# Patient Record
Sex: FEMALE | Race: ASIAN | HISPANIC OR LATINO | Employment: STUDENT | URBAN - METROPOLITAN AREA
[De-identification: names, ages, dates, MRNs, and addresses within clinical notes are randomized per-mention and may not be internally consistent; named-entity substitution may affect disease eponyms.]

---
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
## 2019-08-09 ENCOUNTER — OFFICE VISIT (OUTPATIENT)
Dept: PEDIATRICS CLINIC | Age: 19
End: 2019-08-09
Payer: COMMERCIAL

## 2019-08-09 VITALS
TEMPERATURE: 98.6 F | RESPIRATION RATE: 16 BRPM | BODY MASS INDEX: 25.33 KG/M2 | HEART RATE: 92 BPM | WEIGHT: 143 LBS | SYSTOLIC BLOOD PRESSURE: 108 MMHG | DIASTOLIC BLOOD PRESSURE: 70 MMHG

## 2019-08-09 DIAGNOSIS — K13.0 GRANULOMATOUS CHEILITIS: ICD-10-CM

## 2019-08-09 DIAGNOSIS — J02.9 SORE THROAT: Primary | ICD-10-CM

## 2019-08-09 DIAGNOSIS — R05.9 COUGH: ICD-10-CM

## 2019-08-09 DIAGNOSIS — R11.0 NAUSEA: ICD-10-CM

## 2019-08-09 LAB — S PYO AG THROAT QL: NEGATIVE

## 2019-08-09 PROCEDURE — 87880 STREP A ASSAY W/OPTIC: CPT | Performed by: PEDIATRICS

## 2019-08-09 PROCEDURE — 99213 OFFICE O/P EST LOW 20 MIN: CPT | Performed by: PEDIATRICS

## 2019-08-11 LAB — B-HEM STREP SPEC QL CULT: NEGATIVE

## 2019-08-14 ENCOUNTER — OFFICE VISIT (OUTPATIENT)
Dept: PEDIATRICS CLINIC | Age: 19
End: 2019-08-14
Payer: COMMERCIAL

## 2019-08-14 VITALS
BODY MASS INDEX: 25.33 KG/M2 | TEMPERATURE: 97.2 F | DIASTOLIC BLOOD PRESSURE: 70 MMHG | SYSTOLIC BLOOD PRESSURE: 112 MMHG | WEIGHT: 143 LBS

## 2019-08-14 DIAGNOSIS — J45.20 MILD INTERMITTENT REACTIVE AIRWAY DISEASE: Primary | ICD-10-CM

## 2019-08-14 DIAGNOSIS — J20.9 ACUTE BRONCHITIS, UNSPECIFIED ORGANISM: ICD-10-CM

## 2019-08-14 PROCEDURE — 94664 DEMO&/EVAL PT USE INHALER: CPT | Performed by: PEDIATRICS

## 2019-08-14 PROCEDURE — 99213 OFFICE O/P EST LOW 20 MIN: CPT | Performed by: PEDIATRICS

## 2019-08-14 RX ORDER — AZITHROMYCIN 250 MG/1
TABLET, FILM COATED ORAL
Qty: 6 TABLET | Refills: 0 | Status: SHIPPED | OUTPATIENT
Start: 2019-08-14 | End: 2019-08-18

## 2019-08-14 NOTE — PROGRESS NOTES
Assessment/Plan: We will try proair   Instructions given on how to use it  Will also start on zithromax      Subjective: cough     Patient ID: Deisy Perkins is a 20 y o  female  HPI- has been coughing for 1 week and she is taking benadryl and nyquil and mucinex and it is getting worse  No fever  Nose is dry  SH will be leaving for [facility] - taking Humera every 2 weeks  for granulomatous cheilitis  Review of Systems   Constitutional: Negative for activity change and appetite change  Not as tired   HENT: Negative for congestion and sore throat  Respiratory: Positive for cough  Negative for wheezing  Objective:      /70   Temp (!) 97 2 °F (36 2 °C)   Wt 64 9 kg (143 lb)   BMI 25 33 kg/m²          Physical Exam   Constitutional: No distress  HENT:   TM's not red and throat not red   Cardiovascular:   No murmur heard  Pulmonary/Chest: Breath sounds normal    Skin: No rash noted

## 2020-01-02 PROBLEM — M67.431 GANGLION CYST OF WRIST, RIGHT: Status: ACTIVE | Noted: 2020-01-02

## 2020-03-09 ENCOUNTER — OFFICE VISIT (OUTPATIENT)
Dept: FAMILY MEDICINE CLINIC | Facility: CLINIC | Age: 20
End: 2020-03-09
Payer: COMMERCIAL

## 2020-03-09 VITALS
SYSTOLIC BLOOD PRESSURE: 100 MMHG | RESPIRATION RATE: 16 BRPM | BODY MASS INDEX: 26.58 KG/M2 | TEMPERATURE: 97 F | DIASTOLIC BLOOD PRESSURE: 78 MMHG | WEIGHT: 150 LBS | HEIGHT: 63 IN | HEART RATE: 72 BPM

## 2020-03-09 DIAGNOSIS — Z11.4 SCREENING FOR HIV (HUMAN IMMUNODEFICIENCY VIRUS): ICD-10-CM

## 2020-03-09 DIAGNOSIS — R53.83 FATIGUE, UNSPECIFIED TYPE: ICD-10-CM

## 2020-03-09 DIAGNOSIS — H66.001 NON-RECURRENT ACUTE SUPPURATIVE OTITIS MEDIA OF RIGHT EAR WITHOUT SPONTANEOUS RUPTURE OF TYMPANIC MEMBRANE: ICD-10-CM

## 2020-03-09 DIAGNOSIS — F41.9 ANXIETY: Primary | ICD-10-CM

## 2020-03-09 PROCEDURE — 99204 OFFICE O/P NEW MOD 45 MIN: CPT | Performed by: FAMILY MEDICINE

## 2020-03-09 PROCEDURE — 3008F BODY MASS INDEX DOCD: CPT | Performed by: FAMILY MEDICINE

## 2020-03-09 PROCEDURE — 1036F TOBACCO NON-USER: CPT | Performed by: FAMILY MEDICINE

## 2020-03-09 RX ORDER — HYDROXYZINE HYDROCHLORIDE 25 MG/1
25 TABLET, FILM COATED ORAL EVERY 6 HOURS PRN
Qty: 30 TABLET | Refills: 0 | Status: SHIPPED | OUTPATIENT
Start: 2020-03-09 | End: 2020-11-23 | Stop reason: ALTCHOICE

## 2020-03-09 RX ORDER — AMOXICILLIN 875 MG/1
875 TABLET, COATED ORAL 2 TIMES DAILY
Qty: 20 TABLET | Refills: 0 | Status: SHIPPED | OUTPATIENT
Start: 2020-03-09 | End: 2020-03-19

## 2020-03-09 NOTE — PATIENT INSTRUCTIONS
Iron Deficiency Anemia   WHAT YOU NEED TO KNOW:   Iron deficiency anemia (HAJA) is low levels of red blood cells and hemoglobin caused by a lack of iron in the blood  Iron helps make hemoglobin  Hemoglobin is part of your red blood cell and helps carry oxygen to your body  The most common causes are blood loss and not enough iron in the foods you eat  DISCHARGE INSTRUCTIONS:   Call 911 for any of the following:   · You have shortness of breath, even when you rest     Return to the emergency department if:   · You have dark or bloody bowel movements  · You are too dizzy to stand up  · You have trouble swallowing because of the pain in your mouth and throat  Contact your healthcare provider if:   · You have heartburn, constipation, or diarrhea  · You have nausea or are vomiting  · You are dizzy or very tired  · You have questions or concerns about your condition or care  Medicines: You may need any of the following:  · Iron or folic acid supplements  will help increase your red blood cell and hemoglobin levels  · Bowel movement softeners  may be needed if the iron supplements cause constipation  · Take your medicine as directed  Contact your healthcare provider if you think your medicine is not helping or if you have side effects  Tell him of her if you are allergic to any medicine  Keep a list of the medicines, vitamins, and herbs you take  Include the amounts, and when and why you take them  Bring the list or the pill bottles to follow-up visits  Carry your medicine list with you in case of an emergency  Eat foods rich in iron and protein:  Nuts, meat, dark leafy green vegetables, and beans are high in iron and protein  Do not drink coffee, tea, or other liquids with caffeine  Limit milk to 2 cups a day  You may need to meet with a dietitian to create the right food plan for you  Drink liquids as directed:  Liquids help prevent constipation   Ask how much liquid to drink each day and which liquids are best for you  Follow up with your healthcare provider as directed:  Write down your questions so you remember to ask them during your visits  © 2017 [address] [employer] Information is for End User's use only and may not be sold, redistributed or otherwise used for commercial purposes  All illustrations and images included in CareNotes® are the copyrighted property of A D A M , Inc  or [employer]  The above information is an  only  It is not intended as medical advice for individual conditions or treatments  Talk to your doctor, nurse or pharmacist before following any medical regimen to see if it is safe and effective for you

## 2020-03-09 NOTE — PROGRESS NOTES
Assessment/Plan:       Diagnoses and all orders for this visit:    Anxiety  -     hydrOXYzine HCL (ATARAX) 25 mg tablet; Take 1 tablet (25 mg total) by mouth every 6 (six) hours as needed for anxiety  - Discussed therapy and possible long term therapy with patient, but she states she is going back to college this week and won't be back til summer  Recommend if symptoms worsen, she find a therapist and physician on campus  Fatigue, unspecified type  -     CBC; Future  -     Iron Panel (Includes Ferritin, Iron Sat%, Iron, and TIBC); Future  -     TSH, 3rd generation with Free T4 reflex; Future  -     Comprehensive metabolic panel; Future  -     CBC  -     TSH, 3rd generation with Free T4 reflex  -     Comprehensive metabolic panel    Non-recurrent acute suppurative otitis media of right ear without spontaneous rupture of tympanic membrane  -     amoxicillin (AMOXIL) 875 mg tablet; Take 1 tablet (875 mg total) by mouth 2 (two) times a day for 10 days  - Supportive care reviewed  Screening for HIV (human immunodeficiency virus)  -     HIV 1/2 Antigen/Antibody (4th Generation) w Reflex [facility]; Future      BMI Counseling: Body mass index is 26 57 kg/m²  Discussed with patient's BMI with her  The BMI is above normal  Nutrition recommendations include reducing portion sizes, decreasing overall calorie intake, 3-5 servings of fruits/vegetables daily and reducing fast food intake  Exercise recommendations include moderate aerobic physical activity for 150 minutes/week  Subjective:      Patient ID: Magdalene Desai is a 23 y o  female  URI    This is a new problem  The current episode started in the past 7 days  The problem has been gradually worsening  There has been no fever  Associated symptoms include congestion, coughing, ear pain and a sore throat   Pertinent negatives include no abdominal pain, headaches, joint pain, joint swelling, nausea, neck pain, plugged ear sensation, rash, rhinorrhea, sinus pain, sneezing, swollen glands, vomiting or wheezing  Treatments tried: Nyquil  The treatment provided no relief  Anemia   Presents for initial visit  The condition has lasted for 3 months  Symptoms include malaise/fatigue and pallor  There has been no abdominal pain, anorexia, bruising/bleeding easily, confusion, fever, leg swelling, light-headedness, palpitations, paresthesias, pica or weight loss  Signs of blood loss that are present include menorrhagia  Signs of blood loss that are not present include hematemesis, hematochezia, melena and vaginal bleeding  Past treatments include nothing  Anxiety   Presents for initial visit  Onset was 6 to 12 months ago  The problem has been unchanged  Symptoms include excessive worry and nervous/anxious behavior  Patient reports no confusion, nausea or palpitations  Symptoms occur occasionally  The severity of symptoms is moderate  The quality of sleep is fair  Her past medical history is significant for anemia  The following portions of the patient's history were reviewed and updated as appropriate: allergies, current medications, past family history, past medical history, past social history, past surgical history and problem list     Review of Systems   Constitutional: Positive for malaise/fatigue  Negative for fever and weight loss  HENT: Positive for congestion, ear pain and sore throat  Negative for rhinorrhea, sinus pain and sneezing  Respiratory: Positive for cough  Negative for wheezing  Cardiovascular: Negative for palpitations  Gastrointestinal: Negative for abdominal pain, anorexia, hematemesis, hematochezia, melena, nausea and vomiting  Genitourinary: Positive for menorrhagia  Negative for vaginal bleeding  Musculoskeletal: Negative for joint pain and neck pain  Skin: Positive for pallor  Negative for rash  Neurological: Negative for light-headedness, headaches and paresthesias  Hematological: Does not bruise/bleed easily  Psychiatric/Behavioral: Negative for confusion  The patient is nervous/anxious  Objective:      /78   Pulse 72   Temp (!) 97 °F (36 1 °C)   Resp 16   Ht 5' 3" (1 6 m)   Wt 68 kg (150 lb)   LMP 02/27/2020 (Exact Date)   BMI 26 57 kg/m²          Physical Exam   Constitutional: She is oriented to person, place, and time  She appears well-developed and well-nourished  No distress  HENT:   Head: Normocephalic and atraumatic  Right Ear: Hearing and external ear normal  Tympanic membrane is erythematous and bulging  Left Ear: Hearing, external ear and ear canal normal    Nose: Nose normal    Mouth/Throat: Posterior oropharyngeal erythema present  No oropharyngeal exudate  Tonsils are 1+ on the right  Tonsils are 1+ on the left  No tonsillar exudate  Neck: Normal range of motion  Neck supple  Cardiovascular: Normal rate, regular rhythm, normal heart sounds and intact distal pulses  Exam reveals no gallop and no friction rub  No murmur heard  Pulmonary/Chest: Effort normal and breath sounds normal  No respiratory distress  She has no wheezes  She has no rales  She exhibits no tenderness  Abdominal: Soft  Bowel sounds are normal  She exhibits no distension and no mass  There is no tenderness  There is no rebound and no guarding  Musculoskeletal: Normal range of motion  She exhibits no edema or deformity  Lymphadenopathy:     She has cervical adenopathy  Neurological: She is alert and oriented to person, place, and time  Skin: Skin is warm and dry  She is not diaphoretic  Psychiatric: She has a normal mood and affect   Her behavior is normal  Judgment and thought content normal

## 2020-03-12 LAB
ALBUMIN SERPL-MCNC: 4.3 G/DL (ref 3.9–5)
ALBUMIN/GLOB SERPL: 1.4 {RATIO} (ref 1.2–2.2)
ALP SERPL-CCNC: 70 IU/L (ref 39–117)
ALT SERPL-CCNC: 17 IU/L (ref 0–32)
AST SERPL-CCNC: 17 IU/L (ref 0–40)
BILIRUB SERPL-MCNC: 0.5 MG/DL (ref 0–1.2)
BUN SERPL-MCNC: 5 MG/DL (ref 6–20)
BUN/CREAT SERPL: 8 (ref 9–23)
CALCIUM SERPL-MCNC: 9.3 MG/DL (ref 8.7–10.2)
CHLORIDE SERPL-SCNC: 102 MMOL/L (ref 96–106)
CO2 SERPL-SCNC: 22 MMOL/L (ref 20–29)
CREAT SERPL-MCNC: 0.62 MG/DL (ref 0.57–1)
ERYTHROCYTE [DISTWIDTH] IN BLOOD BY AUTOMATED COUNT: 14.9 % (ref 11.7–15.4)
FERRITIN SERPL-MCNC: 3 NG/ML (ref 15–150)
GLOBULIN SER-MCNC: 3.1 G/DL (ref 1.5–4.5)
GLUCOSE SERPL-MCNC: 92 MG/DL (ref 65–99)
HCT VFR BLD AUTO: 32.9 % (ref 34–46.6)
HGB BLD-MCNC: 10.3 G/DL (ref 11.1–15.9)
HIV 1+2 AB+HIV1 P24 AG SERPL QL IA: NON REACTIVE
IRON SATN MFR SERPL: 6 % (ref 15–55)
IRON SERPL-MCNC: 29 UG/DL (ref 27–159)
MCH RBC QN AUTO: 21.8 PG (ref 26.6–33)
MCHC RBC AUTO-ENTMCNC: 31.3 G/DL (ref 31.5–35.7)
MCV RBC AUTO: 70 FL (ref 79–97)
PLATELET # BLD AUTO: 363 X10E3/UL (ref 150–450)
POTASSIUM SERPL-SCNC: 4.3 MMOL/L (ref 3.5–5.2)
PROT SERPL-MCNC: 7.4 G/DL (ref 6–8.5)
RBC # BLD AUTO: 4.72 X10E6/UL (ref 3.77–5.28)
SL AMB EGFR AFRICAN AMERICAN: 149 ML/MIN/1.73
SL AMB EGFR NON AFRICAN AMERICAN: 129 ML/MIN/1.73
SODIUM SERPL-SCNC: 138 MMOL/L (ref 134–144)
TIBC SERPL-MCNC: 450 UG/DL (ref 250–450)
TSH SERPL DL<=0.005 MIU/L-ACNC: 1.09 UIU/ML (ref 0.45–4.5)
UIBC SERPL-MCNC: 421 UG/DL (ref 131–425)
WBC # BLD AUTO: 7.1 X10E3/UL (ref 3.4–10.8)

## 2020-03-13 ENCOUNTER — TELEPHONE (OUTPATIENT)
Dept: FAMILY MEDICINE CLINIC | Facility: CLINIC | Age: 20
End: 2020-03-13

## 2020-03-13 DIAGNOSIS — D50.9 IRON DEFICIENCY ANEMIA, UNSPECIFIED IRON DEFICIENCY ANEMIA TYPE: Primary | ICD-10-CM

## 2020-03-13 RX ORDER — FERROUS SULFATE TAB EC 324 MG (65 MG FE EQUIVALENT) 324 (65 FE) MG
324 TABLET DELAYED RESPONSE ORAL
Qty: 60 TABLET | Refills: 1 | Status: SHIPPED | OUTPATIENT
Start: 2020-03-13 | End: 2021-09-28 | Stop reason: SDUPTHER

## 2020-05-19 ENCOUNTER — OFFICE VISIT (OUTPATIENT)
Dept: FAMILY MEDICINE CLINIC | Facility: CLINIC | Age: 20
End: 2020-05-19
Payer: COMMERCIAL

## 2020-05-19 ENCOUNTER — TELEPHONE (OUTPATIENT)
Dept: FAMILY MEDICINE CLINIC | Facility: CLINIC | Age: 20
End: 2020-05-19

## 2020-05-19 VITALS
TEMPERATURE: 98.4 F | BODY MASS INDEX: 28 KG/M2 | DIASTOLIC BLOOD PRESSURE: 60 MMHG | HEIGHT: 63 IN | HEART RATE: 72 BPM | SYSTOLIC BLOOD PRESSURE: 100 MMHG | RESPIRATION RATE: 16 BRPM | WEIGHT: 158 LBS

## 2020-05-19 DIAGNOSIS — T14.8XXA SPLINTER IN SKIN: Primary | ICD-10-CM

## 2020-05-19 PROCEDURE — 1036F TOBACCO NON-USER: CPT | Performed by: FAMILY MEDICINE

## 2020-05-19 PROCEDURE — 3008F BODY MASS INDEX DOCD: CPT | Performed by: FAMILY MEDICINE

## 2020-05-19 PROCEDURE — 99213 OFFICE O/P EST LOW 20 MIN: CPT | Performed by: FAMILY MEDICINE

## 2020-05-22 ENCOUNTER — TELEPHONE (OUTPATIENT)
Dept: FAMILY MEDICINE CLINIC | Facility: CLINIC | Age: 20
End: 2020-05-22

## 2020-05-22 DIAGNOSIS — T14.8XXA SPLINTER IN SKIN: Primary | ICD-10-CM

## 2020-05-22 RX ORDER — SULFAMETHOXAZOLE AND TRIMETHOPRIM 800; 160 MG/1; MG/1
1 TABLET ORAL EVERY 12 HOURS SCHEDULED
Qty: 14 TABLET | Refills: 0 | Status: SHIPPED | OUTPATIENT
Start: 2020-05-22 | End: 2020-05-29

## 2020-11-23 ENCOUNTER — OFFICE VISIT (OUTPATIENT)
Dept: FAMILY MEDICINE CLINIC | Facility: CLINIC | Age: 20
End: 2020-11-23
Payer: COMMERCIAL

## 2020-11-23 VITALS
DIASTOLIC BLOOD PRESSURE: 70 MMHG | SYSTOLIC BLOOD PRESSURE: 110 MMHG | HEART RATE: 83 BPM | WEIGHT: 159 LBS | OXYGEN SATURATION: 99 % | BODY MASS INDEX: 28.17 KG/M2 | RESPIRATION RATE: 16 BRPM | TEMPERATURE: 97.5 F | HEIGHT: 63 IN

## 2020-11-23 DIAGNOSIS — Z13.83 SCREENING FOR CARDIOVASCULAR, RESPIRATORY, AND GENITOURINARY DISEASES: ICD-10-CM

## 2020-11-23 DIAGNOSIS — Z13.89 SCREENING FOR CARDIOVASCULAR, RESPIRATORY, AND GENITOURINARY DISEASES: ICD-10-CM

## 2020-11-23 DIAGNOSIS — Z23 IMMUNIZATION DUE: ICD-10-CM

## 2020-11-23 DIAGNOSIS — D50.8 OTHER IRON DEFICIENCY ANEMIA: ICD-10-CM

## 2020-11-23 DIAGNOSIS — Z13.1 SCREENING FOR DIABETES MELLITUS (DM): ICD-10-CM

## 2020-11-23 DIAGNOSIS — Z00.00 HEALTHCARE MAINTENANCE: Primary | ICD-10-CM

## 2020-11-23 DIAGNOSIS — Z13.6 SCREENING FOR CARDIOVASCULAR, RESPIRATORY, AND GENITOURINARY DISEASES: ICD-10-CM

## 2020-11-23 DIAGNOSIS — K50.90 CROHN'S DISEASE WITHOUT COMPLICATION, UNSPECIFIED GASTROINTESTINAL TRACT LOCATION (HCC): ICD-10-CM

## 2020-11-23 PROBLEM — D50.9 IDA (IRON DEFICIENCY ANEMIA): Status: ACTIVE | Noted: 2020-11-23

## 2020-11-23 PROCEDURE — 90686 IIV4 VACC NO PRSV 0.5 ML IM: CPT

## 2020-11-23 PROCEDURE — 90715 TDAP VACCINE 7 YRS/> IM: CPT

## 2020-11-23 PROCEDURE — 99214 OFFICE O/P EST MOD 30 MIN: CPT | Performed by: FAMILY MEDICINE

## 2020-11-23 PROCEDURE — 90472 IMMUNIZATION ADMIN EACH ADD: CPT

## 2020-11-23 PROCEDURE — 3008F BODY MASS INDEX DOCD: CPT | Performed by: FAMILY MEDICINE

## 2020-11-23 PROCEDURE — 90471 IMMUNIZATION ADMIN: CPT

## 2020-11-23 PROCEDURE — 3725F SCREEN DEPRESSION PERFORMED: CPT | Performed by: FAMILY MEDICINE

## 2020-11-23 PROCEDURE — 1036F TOBACCO NON-USER: CPT | Performed by: FAMILY MEDICINE

## 2020-11-23 PROCEDURE — 90651 9VHPV VACCINE 2/3 DOSE IM: CPT

## 2020-12-16 LAB
IRON SATN MFR SERPL: 10 % (ref 15–55)
IRON SERPL-MCNC: 45 UG/DL (ref 27–159)
TIBC SERPL-MCNC: 450 UG/DL (ref 250–450)
UIBC SERPL-MCNC: 405 UG/DL (ref 131–425)

## 2020-12-17 LAB
ALBUMIN SERPL-MCNC: 4.5 G/DL (ref 3.9–5)
ALBUMIN/GLOB SERPL: 1.4 {RATIO} (ref 1.2–2.2)
ALP SERPL-CCNC: 67 IU/L (ref 39–117)
ALT SERPL-CCNC: 18 IU/L (ref 0–32)
AST SERPL-CCNC: 16 IU/L (ref 0–40)
BASOPHILS # BLD AUTO: 0.1 X10E3/UL (ref 0–0.2)
BASOPHILS NFR BLD AUTO: 1 %
BILIRUB SERPL-MCNC: 0.4 MG/DL (ref 0–1.2)
BUN SERPL-MCNC: 7 MG/DL (ref 6–20)
BUN/CREAT SERPL: 10 (ref 9–23)
CALCIUM SERPL-MCNC: 9.5 MG/DL (ref 8.7–10.2)
CHLORIDE SERPL-SCNC: 103 MMOL/L (ref 96–106)
CHOLEST SERPL-MCNC: 163 MG/DL (ref 100–199)
CO2 SERPL-SCNC: 23 MMOL/L (ref 20–29)
CREAT SERPL-MCNC: 0.67 MG/DL (ref 0.57–1)
EOSINOPHIL # BLD AUTO: 0.3 X10E3/UL (ref 0–0.4)
EOSINOPHIL NFR BLD AUTO: 5 %
ERYTHROCYTE [DISTWIDTH] IN BLOOD BY AUTOMATED COUNT: 14.1 % (ref 11.7–15.4)
GLOBULIN SER-MCNC: 3.3 G/DL (ref 1.5–4.5)
GLUCOSE SERPL-MCNC: 95 MG/DL (ref 65–99)
HCT VFR BLD AUTO: 37.8 % (ref 34–46.6)
HDLC SERPL-MCNC: 41 MG/DL
HGB BLD-MCNC: 11.3 G/DL (ref 11.1–15.9)
IMM GRANULOCYTES # BLD: 0 X10E3/UL (ref 0–0.1)
IMM GRANULOCYTES NFR BLD: 0 %
LDLC SERPL CALC-MCNC: 109 MG/DL (ref 0–99)
LDLC/HDLC SERPL: 2.7 RATIO (ref 0–3.2)
LYMPHOCYTES # BLD AUTO: 2.4 X10E3/UL (ref 0.7–3.1)
LYMPHOCYTES NFR BLD AUTO: 41 %
MCH RBC QN AUTO: 23.6 PG (ref 26.6–33)
MCHC RBC AUTO-ENTMCNC: 29.9 G/DL (ref 31.5–35.7)
MCV RBC AUTO: 79 FL (ref 79–97)
MICRODELETION SYND BLD/T FISH: NORMAL
MONOCYTES # BLD AUTO: 0.5 X10E3/UL (ref 0.1–0.9)
MONOCYTES NFR BLD AUTO: 8 %
NEUTROPHILS # BLD AUTO: 2.6 X10E3/UL (ref 1.4–7)
NEUTROPHILS NFR BLD AUTO: 45 %
PLATELET # BLD AUTO: 342 X10E3/UL (ref 150–450)
POTASSIUM SERPL-SCNC: 4.5 MMOL/L (ref 3.5–5.2)
PROT SERPL-MCNC: 7.8 G/DL (ref 6–8.5)
RBC # BLD AUTO: 4.79 X10E6/UL (ref 3.77–5.28)
SL AMB EGFR AFRICAN AMERICAN: 145 ML/MIN/1.73
SL AMB EGFR NON AFRICAN AMERICAN: 126 ML/MIN/1.73
SL AMB VLDL CHOLESTEROL CALC: 13 MG/DL (ref 5–40)
SODIUM SERPL-SCNC: 138 MMOL/L (ref 134–144)
TRIGL SERPL-MCNC: 67 MG/DL (ref 0–149)
WBC # BLD AUTO: 5.7 X10E3/UL (ref 3.4–10.8)

## 2020-12-30 ENCOUNTER — TELEMEDICINE (OUTPATIENT)
Dept: FAMILY MEDICINE CLINIC | Facility: CLINIC | Age: 20
End: 2020-12-30

## 2020-12-30 ENCOUNTER — CLINICAL SUPPORT (OUTPATIENT)
Dept: FAMILY MEDICINE CLINIC | Facility: CLINIC | Age: 20
End: 2020-12-30
Payer: COMMERCIAL

## 2020-12-30 DIAGNOSIS — D50.8 OTHER IRON DEFICIENCY ANEMIA: ICD-10-CM

## 2020-12-30 DIAGNOSIS — K13.0 GRANULOMATOUS CHEILITIS: ICD-10-CM

## 2020-12-30 DIAGNOSIS — F41.1 GAD (GENERALIZED ANXIETY DISORDER): Primary | ICD-10-CM

## 2020-12-30 DIAGNOSIS — Z23 NEED FOR HPV VACCINE: Primary | ICD-10-CM

## 2020-12-30 PROCEDURE — 90471 IMMUNIZATION ADMIN: CPT

## 2020-12-30 PROCEDURE — 90651 9VHPV VACCINE 2/3 DOSE IM: CPT

## 2020-12-30 PROCEDURE — 1036F TOBACCO NON-USER: CPT | Performed by: FAMILY MEDICINE

## 2020-12-30 PROCEDURE — 99214 OFFICE O/P EST MOD 30 MIN: CPT | Performed by: FAMILY MEDICINE

## 2020-12-30 RX ORDER — ESCITALOPRAM OXALATE 10 MG/1
10 TABLET ORAL DAILY
Qty: 90 TABLET | Refills: 1 | Status: SHIPPED | OUTPATIENT
Start: 2020-12-30 | End: 2021-03-11 | Stop reason: SDUPTHER

## 2021-03-09 DIAGNOSIS — F41.1 GAD (GENERALIZED ANXIETY DISORDER): ICD-10-CM

## 2021-03-09 RX ORDER — ESCITALOPRAM OXALATE 10 MG/1
10 TABLET ORAL DAILY
Qty: 90 TABLET | Refills: 1 | OUTPATIENT
Start: 2021-03-09

## 2021-03-11 DIAGNOSIS — F41.1 GAD (GENERALIZED ANXIETY DISORDER): ICD-10-CM

## 2021-03-11 RX ORDER — ESCITALOPRAM OXALATE 10 MG/1
10 TABLET ORAL DAILY
Qty: 90 TABLET | Refills: 1 | Status: SHIPPED | OUTPATIENT
Start: 2021-03-11 | End: 2021-09-28 | Stop reason: SDUPTHER

## 2021-05-21 ENCOUNTER — TELEPHONE (OUTPATIENT)
Dept: FAMILY MEDICINE CLINIC | Facility: CLINIC | Age: 21
End: 2021-05-21

## 2021-05-21 NOTE — TELEPHONE ENCOUNTER
Dr Shawn Massey,    Would you please order HPV for next Friday's nursing appt?  Thanks [employer]

## 2021-06-02 ENCOUNTER — TELEPHONE (OUTPATIENT)
Dept: FAMILY MEDICINE CLINIC | Facility: CLINIC | Age: 21
End: 2021-06-02

## 2021-09-28 ENCOUNTER — TELEMEDICINE (OUTPATIENT)
Dept: FAMILY MEDICINE CLINIC | Facility: CLINIC | Age: 21
End: 2021-09-28
Payer: COMMERCIAL

## 2021-09-28 DIAGNOSIS — D50.9 IRON DEFICIENCY ANEMIA, UNSPECIFIED IRON DEFICIENCY ANEMIA TYPE: ICD-10-CM

## 2021-09-28 DIAGNOSIS — F41.1 GAD (GENERALIZED ANXIETY DISORDER): Primary | ICD-10-CM

## 2021-09-28 PROCEDURE — 99213 OFFICE O/P EST LOW 20 MIN: CPT | Performed by: FAMILY MEDICINE

## 2021-09-28 RX ORDER — ESCITALOPRAM OXALATE 10 MG/1
10 TABLET ORAL DAILY
Qty: 90 TABLET | Refills: 1 | Status: SHIPPED | OUTPATIENT
Start: 2021-09-28 | End: 2021-12-27 | Stop reason: SDUPTHER

## 2021-09-28 RX ORDER — FERROUS SULFATE TAB EC 324 MG (65 MG FE EQUIVALENT) 324 (65 FE) MG
324 TABLET DELAYED RESPONSE ORAL
Qty: 30 TABLET | Refills: 5
Start: 2021-09-28

## 2021-09-29 NOTE — PROGRESS NOTES
Virtual Regular Visit    Verification of patient location:    Patient is located in the following state in which I hold an active license [place]    On humira still      Assessment/Plan:    Problem List Items Addressed This Visit        Active Problems    HAJA (iron deficiency anemia)    Relevant Medications    ferrous sulfate 324 (65 Fe) mg    MAYELA (generalized anxiety disorder) - Primary    Relevant Medications    escitalopram (LEXAPRO) 10 mg tablet           MAYELA stable, continue lexapro, denies side effects  HAJA stable, on 1 iron/d, labs in future  cpe in December suggested, with labs before    Reason for visit is   Chief Complaint   Patient presents with    Virtual Regular Visit        Encounter provider Yinka Saumel DO    Provider located at [address]  [address] [place] [id number]      Recent Visits  No visits were found meeting these conditions  Showing recent visits within past 7 days and meeting all other requirements  Today's Visits  Date Type Provider Dept   09/28/21 960 Dante Soni [facility], [address] [place] [address] today's visits and meeting all other requirements  Future Appointments  No visits were found meeting these conditions  Showing future appointments within next 150 days and meeting all other requirements       The patient was identified by name and date of birth  Malinda Rodriguez was informed that this is a telemedicine visit and that the visit is being conducted through [address] [place] [address] and patient was informed that this is a secure, HIPAA-compliant platform  She agrees to proceed     My office door was closed  No one else was in the room  She acknowledged consent and understanding of privacy and security of the video platform  The patient has agreed to participate and understands they can discontinue the visit at any time  Patient is aware this is a billable service       Noreen Garza is a 24 y o  female with MAYELA        HPI   [employer]  Had 1 covid shot of 2  MAYELA stable on lexapro  Sleeping ok  Trying to eat right  Lost some wt  Some exercise    Takes Iron and D  1 iron per day  Periods heavy  Cbc good while on 1 pill/d in Dec 2020    Less anxious  Mood better overall  Less irritable  Less angry  Less upset    No n/v  Past Medical History:   Diagnosis Date    Lip edema        Past Surgical History:   Procedure Laterality Date    DENTAL SURGERY      NH LAP,CHOLECYSTECTOMY N/A 12/16/2016    Procedure: CHOLECYSTECTOMY LAPAROSCOPIC;  Surgeon: Demar Ramos MD;  Location: [address] [place] [address];  Service: General       Current Outpatient Medications   Medication Sig Dispense Refill    adalimumab (HUMIRA) 20 mg/0 4 mL PSKT injection 40 mg every 2 weeks 0 4 mL 0    escitalopram (LEXAPRO) 10 mg tablet Take 1 tablet (10 mg total) by mouth daily 90 tablet 1    ferrous sulfate 324 (65 Fe) mg Take 1 tablet (324 mg total) by mouth daily before breakfast 30 tablet 5     No current facility-administered medications for this visit  Allergies   Allergen Reactions    No Known Allergies        Review of Systems   Constitutional: Negative for fever  Respiratory: Negative for shortness of breath  Video Exam    There were no vitals filed for this visit  Physical Exam  Constitutional:       General: She is not in acute distress  HENT:      Head: Normocephalic  Eyes:      General: No scleral icterus  Neck:      Comments: No stiffness  Pulmonary:      Effort: No respiratory distress  Breath sounds: No stridor  Comments: Speaks full sentences  Musculoskeletal:      Cervical back: No rigidity  Skin:     Coloration: Skin is not pale  Neurological:      Mental Status: She is alert  Psychiatric:         Mood and Affect: Mood normal          Behavior: Behavior normal          Thought Content:  Thought content normal           I spent 16 minutes directly with the patient during this visit    VIRTUAL VISIT [facility] verbally agrees to participate in [employer]  Pt is aware that [employer] could be limited without vital signs or the ability to perform a full hands-on physical Wanda Delgado understands she or the provider may request at any time to terminate the video visit and request the patient to seek care or treatment in person

## 2021-12-27 DIAGNOSIS — F41.1 GAD (GENERALIZED ANXIETY DISORDER): ICD-10-CM

## 2021-12-27 RX ORDER — ESCITALOPRAM OXALATE 10 MG/1
10 TABLET ORAL DAILY
Qty: 90 TABLET | Refills: 1 | Status: SHIPPED | OUTPATIENT
Start: 2021-12-27 | End: 2022-06-14

## 2022-06-07 DIAGNOSIS — F41.1 GAD (GENERALIZED ANXIETY DISORDER): ICD-10-CM

## 2022-06-14 RX ORDER — ESCITALOPRAM OXALATE 10 MG/1
TABLET ORAL
Qty: 90 TABLET | Refills: 3 | Status: SHIPPED | OUTPATIENT
Start: 2022-06-14

## 2022-09-26 ENCOUNTER — OFFICE VISIT (OUTPATIENT)
Dept: FAMILY MEDICINE CLINIC | Facility: CLINIC | Age: 22
End: 2022-09-26
Payer: COMMERCIAL

## 2022-09-26 VITALS
SYSTOLIC BLOOD PRESSURE: 92 MMHG | OXYGEN SATURATION: 98 % | BODY MASS INDEX: 29.77 KG/M2 | HEIGHT: 63 IN | RESPIRATION RATE: 18 BRPM | TEMPERATURE: 97.8 F | HEART RATE: 87 BPM | WEIGHT: 168 LBS | DIASTOLIC BLOOD PRESSURE: 70 MMHG

## 2022-09-26 DIAGNOSIS — D50.8 OTHER IRON DEFICIENCY ANEMIA: ICD-10-CM

## 2022-09-26 DIAGNOSIS — Z00.00 HEALTHCARE MAINTENANCE: ICD-10-CM

## 2022-09-26 DIAGNOSIS — J01.00 ACUTE NON-RECURRENT MAXILLARY SINUSITIS: Primary | ICD-10-CM

## 2022-09-26 DIAGNOSIS — K13.0 GRANULOMATOUS CHEILITIS: ICD-10-CM

## 2022-09-26 DIAGNOSIS — F41.1 GAD (GENERALIZED ANXIETY DISORDER): ICD-10-CM

## 2022-09-26 PROBLEM — K50.90 CROHN'S DISEASE (HCC): Status: RESOLVED | Noted: 2017-08-01 | Resolved: 2022-09-26

## 2022-09-26 PROCEDURE — 99214 OFFICE O/P EST MOD 30 MIN: CPT | Performed by: FAMILY MEDICINE

## 2022-09-26 RX ORDER — AZITHROMYCIN 250 MG/1
TABLET, FILM COATED ORAL
Qty: 6 TABLET | Refills: 0 | Status: SHIPPED | OUTPATIENT
Start: 2022-09-26 | End: 2022-10-01

## 2022-09-26 NOTE — PATIENT INSTRUCTIONS
Try to make a product like mucinex D twice a day that has a decongestant and expectorant in it  Add on the antibiotic if you do not continue to improve over the next 1-2 days

## 2022-09-26 NOTE — PROGRESS NOTES
Assessment/Plan:    No problem-specific Assessment & Plan notes found for this encounter  Early sinusitis  Could continue 1-2d of otc then add abx if no better    Granulomatous cheilitis w/o endoscopically proven crohns, f/u with derm for Humira    HAJA hx, on iron every other day, suggest qd with colace and vitamin C to improve Iron levels but consider gyn eval for BCP options for menstrual regularity    MAYELA stable on lexapro     Diagnoses and all orders for this visit:    Acute non-recurrent maxillary sinusitis  -     azithromycin (ZITHROMAX) 250 mg tablet; Take 500mg on day 1, 250mg on days 2-5    Healthcare maintenance  -     Ambulatory referral to Obstetrics / Gynecology; Future    Other iron deficiency anemia    Granulomatous cheilitis    MAYELA (generalized anxiety disorder)        Return if symptoms worsen or fail to improve  Subjective:      Patient ID: Hamzah Saldivar is a 20 y o  female  Chief Complaint   Patient presents with    Cough    Cold Like Symptoms     Covid neg 3 days ago                   Sas/cma       HPI  Done with college  Journalism degree  EMS dispatch for now  Planning masters in [employer] from derm still    Still on iron  Periods are heavy  Not on bcp  Not needing contraception at this time    No gyn or pap yet    MAYELA stable on anxiety    3d cough  No mucus  No fever  Nasal/sinus congestion  Sore throat at first  No sob  No sick contacts  Maybe a little better today    hgb 12 9 on iron 1 every 2 days due to constipation    The following portions of the patient's history were reviewed and updated as appropriate: allergies, current medications, past family history, past medical history, past social history, past surgical history and problem list     Review of Systems   Respiratory: Negative for shortness of breath  Cardiovascular: Negative for chest pain           Current Outpatient Medications   Medication Sig Dispense Refill    azithromycin (ZITHROMAX) 250 mg tablet Take 500mg on day 1, 250mg on days 2-5 6 tablet 0    adalimumab (HUMIRA) 20 mg/0 4 mL PSKT injection 40 mg every 2 weeks 0 4 mL 0    escitalopram (LEXAPRO) 10 mg tablet TAKE 1 TABLET DAILY 90 tablet 3    ferrous sulfate 324 (65 Fe) mg Take 1 tablet (324 mg total) by mouth daily before breakfast 30 tablet 5     No current facility-administered medications for this visit  Objective:    BP 92/70   Pulse 87   Temp 97 8 °F (36 6 °C)   Resp 18   Ht 5' 3" (1 6 m)   Wt 76 2 kg (168 lb)   LMP  (LMP Unknown)   SpO2 98%   BMI 29 76 kg/m²        Physical Exam  Vitals and nursing note reviewed  Constitutional:       General: She is not in acute distress  Appearance: She is well-developed  She is not ill-appearing  HENT:      Head: Normocephalic  Right Ear: Tympanic membrane normal       Left Ear: Tympanic membrane normal       Nose: Congestion present  Eyes:      General: No scleral icterus  Conjunctiva/sclera: Conjunctivae normal    Cardiovascular:      Rate and Rhythm: Normal rate and regular rhythm  Pulmonary:      Effort: Pulmonary effort is normal  No respiratory distress  Breath sounds: No wheezing  Abdominal:      Palpations: Abdomen is soft  Musculoskeletal:         General: No deformity  Cervical back: Neck supple  Skin:     General: Skin is warm and dry  Coloration: Skin is not pale  Neurological:      Mental Status: She is alert  Psychiatric:         Mood and Affect: Mood normal          Behavior: Behavior normal          Thought Content:  Thought content normal                 Bc Infante DO

## 2022-09-26 NOTE — LETTER
September 26, 2022     Patient: Cate Drake  YOB: 2000  Date of Visit: 9/26/2022      To Whom it May Concern:    Cate Drake is under my professional care  Paola Enriquez was seen in my office on 9/26/2022  Excuse from work on 9/25/22  If you have any questions or concerns, please don't hesitate to call           Sincerely,          Baldemar Billy,         CC: No Recipients

## 2022-10-12 PROBLEM — Z00.00 HEALTHCARE MAINTENANCE: Status: RESOLVED | Noted: 2020-11-23 | Resolved: 2022-10-12

## 2022-11-25 PROBLEM — J01.00 ACUTE NON-RECURRENT MAXILLARY SINUSITIS: Status: RESOLVED | Noted: 2022-09-26 | Resolved: 2022-11-25

## 2023-06-02 DIAGNOSIS — F41.1 GAD (GENERALIZED ANXIETY DISORDER): ICD-10-CM

## 2023-06-02 RX ORDER — ESCITALOPRAM OXALATE 10 MG/1
TABLET ORAL
Qty: 90 TABLET | Refills: 0 | Status: SHIPPED | OUTPATIENT
Start: 2023-06-02

## 2023-08-31 DIAGNOSIS — F41.1 GAD (GENERALIZED ANXIETY DISORDER): ICD-10-CM

## 2023-08-31 RX ORDER — ESCITALOPRAM OXALATE 10 MG/1
TABLET ORAL
Qty: 90 TABLET | Refills: 0 | Status: SHIPPED | OUTPATIENT
Start: 2023-08-31

## 2024-02-23 NOTE — PROGRESS NOTES
Assessment/Plan:         rapid strep negative  Has the symptoms for 2 days only  Advised rest for today  She is working in [employer] Possible virus    Subjective: cough     Patient ID: Lino Alonzo is a 20 y o  female  HPI- sore throat cough and nausea for 2 days  No fever    The cough is a dry cough and sore throat is better  PH diagnosed with granulomatous cheilitis and taking HUMIRA 40mg every 2 weeks   She is doing well  The following portions of the patient's history were reviewed and updated as appropriate: allergies, current medications, past medical history, past social history and problem list     Review of Systems   Constitutional: Negative for activity change and appetite change  Feels tired but still goes to work in a [employer] [address]: Negative for congestion  Respiratory: Positive for cough  Has a dry cough   Gastrointestinal: Negative for diarrhea  Feels nauseated but no vomiting         Objective:      /70   Pulse 92   Temp 98 6 °F (37 °C) (Temporal)   Resp 16   Wt 64 9 kg (143 lb)   BMI 25 33 kg/m²          Physical Exam   Constitutional: She appears well-nourished  No distress  HENT:   Nose: Nose normal    Mouth/Throat: Oropharynx is clear and moist    Eyes: Conjunctivae and EOM are normal  Right eye exhibits no discharge  Left eye exhibits no discharge  Neck: Neck supple  Cardiovascular:   No murmur heard  Pulmonary/Chest: Breath sounds normal    Abdominal: There is no tenderness  Genitourinary: No vaginal discharge found  Musculoskeletal: Normal range of motion  Lymphadenopathy:     She has no cervical adenopathy  Neurological: She is alert  Skin: No rash noted       Had breast reduction surgery and feels good about it Patient has contact our office to schedule a colonscopy due to personal history of colon polyps. The schedule date is 6/11/24 at [facility] . I checked [facility] website and I was not able to locate a referral on file for Dr. Adames. Can we obtain a [facility] referral for patient? Thank you    Lita Mcfarland

## 2024-07-01 ENCOUNTER — TELEPHONE (OUTPATIENT)
Dept: SURGERY | Facility: CLINIC | Age: 24
End: 2024-07-01